# Patient Record
Sex: MALE | Race: WHITE | Employment: FULL TIME | URBAN - METROPOLITAN AREA
[De-identification: names, ages, dates, MRNs, and addresses within clinical notes are randomized per-mention and may not be internally consistent; named-entity substitution may affect disease eponyms.]

---

## 2017-02-13 ENCOUNTER — TELEPHONE (OUTPATIENT)
Dept: BARIATRICS/WEIGHT MGMT | Age: 52
End: 2017-02-13

## 2018-02-04 ENCOUNTER — TELEPHONE (OUTPATIENT)
Dept: BARIATRICS/WEIGHT MGMT | Age: 53
End: 2018-02-04

## 2020-01-02 ENCOUNTER — OFFICE VISIT (OUTPATIENT)
Dept: BARIATRICS/WEIGHT MGMT | Age: 55
End: 2020-01-02
Payer: COMMERCIAL

## 2020-01-02 ENCOUNTER — HOSPITAL ENCOUNTER (OUTPATIENT)
Age: 55
Discharge: HOME | End: 2020-01-02
Payer: COMMERCIAL

## 2020-01-02 VITALS
BODY MASS INDEX: 44.1 KG/M2 | SYSTOLIC BLOOD PRESSURE: 134 MMHG | WEIGHT: 315 LBS | HEART RATE: 58 BPM | HEIGHT: 71 IN | RESPIRATION RATE: 15 BRPM | DIASTOLIC BLOOD PRESSURE: 88 MMHG

## 2020-01-02 LAB
A/G RATIO: 1.2 (ref 1.1–2.2)
ALBUMIN SERPL-MCNC: 3.9 G/DL (ref 3.4–5)
ALP BLD-CCNC: 70 U/L (ref 40–129)
ALT SERPL-CCNC: 15 U/L (ref 10–40)
ANION GAP SERPL CALCULATED.3IONS-SCNC: 12 MMOL/L (ref 3–16)
AST SERPL-CCNC: 19 U/L (ref 15–37)
BASOPHILS ABSOLUTE: 0 K/UL (ref 0–0.2)
BASOPHILS RELATIVE PERCENT: 0.4 %
BILIRUB SERPL-MCNC: 0.5 MG/DL (ref 0–1)
BUN BLDV-MCNC: 16 MG/DL (ref 7–20)
CALCIUM SERPL-MCNC: 9.1 MG/DL (ref 8.3–10.6)
CHLORIDE BLD-SCNC: 103 MMOL/L (ref 99–110)
CHOLESTEROL, TOTAL: 185 MG/DL (ref 0–199)
CO2: 26 MMOL/L (ref 21–32)
CREAT SERPL-MCNC: 0.7 MG/DL (ref 0.9–1.3)
EOSINOPHILS ABSOLUTE: 0.1 K/UL (ref 0–0.6)
EOSINOPHILS RELATIVE PERCENT: 2.1 %
FOLATE: 5.78 NG/ML (ref 4.78–24.2)
GFR AFRICAN AMERICAN: >60
GFR NON-AFRICAN AMERICAN: >60
GLOBULIN: 3.2 G/DL
GLUCOSE BLD-MCNC: 99 MG/DL (ref 70–99)
HCT VFR BLD CALC: 40.3 % (ref 40.5–52.5)
HDLC SERPL-MCNC: 44 MG/DL (ref 40–60)
HEMOGLOBIN: 13.4 G/DL (ref 13.5–17.5)
IRON SATURATION: 32 % (ref 20–50)
IRON: 88 UG/DL (ref 59–158)
LDL CHOLESTEROL CALCULATED: 116 MG/DL
LYMPHOCYTES ABSOLUTE: 1.7 K/UL (ref 1–5.1)
LYMPHOCYTES RELATIVE PERCENT: 24.6 %
MCH RBC QN AUTO: 30.8 PG (ref 26–34)
MCHC RBC AUTO-ENTMCNC: 33.3 G/DL (ref 31–36)
MCV RBC AUTO: 92.3 FL (ref 80–100)
MONOCYTES ABSOLUTE: 0.6 K/UL (ref 0–1.3)
MONOCYTES RELATIVE PERCENT: 8.9 %
NEUTROPHILS ABSOLUTE: 4.3 K/UL (ref 1.7–7.7)
NEUTROPHILS RELATIVE PERCENT: 64 %
PDW BLD-RTO: 13.4 % (ref 12.4–15.4)
PLATELET # BLD: 231 K/UL (ref 135–450)
PMV BLD AUTO: 8.6 FL (ref 5–10.5)
POTASSIUM SERPL-SCNC: 4.8 MMOL/L (ref 3.5–5.1)
RBC # BLD: 4.37 M/UL (ref 4.2–5.9)
SODIUM BLD-SCNC: 141 MMOL/L (ref 136–145)
T4 FREE: 1.1 NG/DL (ref 0.9–1.8)
TOTAL IRON BINDING CAPACITY: 279 UG/DL (ref 260–445)
TOTAL PROTEIN: 7.1 G/DL (ref 6.4–8.2)
TRIGL SERPL-MCNC: 123 MG/DL (ref 0–150)
TSH REFLEX: 4.79 UIU/ML (ref 0.27–4.2)
VITAMIN B-12: 162 PG/ML (ref 211–911)
VITAMIN D 25-HYDROXY: 15.7 NG/ML
VLDLC SERPL CALC-MCNC: 25 MG/DL
WBC # BLD: 6.8 K/UL (ref 4–11)

## 2020-01-02 PROCEDURE — 84597 ASSAY OF VITAMIN K: CPT

## 2020-01-02 PROCEDURE — 84443 ASSAY THYROID STIM HORMONE: CPT

## 2020-01-02 PROCEDURE — 84439 ASSAY OF FREE THYROXINE: CPT

## 2020-01-02 PROCEDURE — 84446 ASSAY OF VITAMIN E: CPT

## 2020-01-02 PROCEDURE — 84590 ASSAY OF VITAMIN A: CPT

## 2020-01-02 PROCEDURE — 80061 LIPID PANEL: CPT

## 2020-01-02 PROCEDURE — 36415 COLL VENOUS BLD VENIPUNCTURE: CPT

## 2020-01-02 PROCEDURE — 82306 VITAMIN D 25 HYDROXY: CPT

## 2020-01-02 PROCEDURE — 80053 COMPREHEN METABOLIC PANEL: CPT

## 2020-01-02 PROCEDURE — 82746 ASSAY OF FOLIC ACID SERUM: CPT

## 2020-01-02 PROCEDURE — 99214 OFFICE O/P EST MOD 30 MIN: CPT | Performed by: NURSE PRACTITIONER

## 2020-01-02 PROCEDURE — 82607 VITAMIN B-12: CPT

## 2020-01-02 PROCEDURE — 83550 IRON BINDING TEST: CPT

## 2020-01-02 PROCEDURE — 84425 ASSAY OF VITAMIN B-1: CPT

## 2020-01-02 PROCEDURE — 83036 HEMOGLOBIN GLYCOSYLATED A1C: CPT

## 2020-01-02 PROCEDURE — 83540 ASSAY OF IRON: CPT

## 2020-01-02 PROCEDURE — 85025 COMPLETE CBC W/AUTO DIFF WBC: CPT

## 2020-01-02 ASSESSMENT — ENCOUNTER SYMPTOMS
GASTROINTESTINAL NEGATIVE: 1
RESPIRATORY NEGATIVE: 1

## 2020-01-02 NOTE — PROGRESS NOTES
Dietary Assessment Note    Vitals:   Vitals:    01/02/20 0824   BP: (!) 146/88   Pulse: 58   Resp: 15   Weight: (!) 355 lb 8 oz (161.3 kg)   Height: 5' 11\" (1.803 m)    Patient gained 15.5 lbs over 4 years 9 months. Total Weight Loss: 104.5 lbs    Labs reviewed: no lab studies available for review at time of visit    Protein intake: Patient not tracking     Fluid intake: 2-3 bottles water/day + Coffee w/ cream/sugar, 1 pop/day    Multivitamin/mineral intake: Not taking any    Calcium intake: none    Other: none    Exercise: Nothing structured- Likes to hunt + has a gym available at work     Nutrition Assessment: 7.5 years post-op visit. Pt reports he has tried the [employer] and [employer]ipe[employer] in the year. Pt works 3rd shift wakes up around 5-6 PM, eating about 4 times/day. Breakfast: 4:38 AM: 10 slices hammond + 3 eggs + 2 biscuits     Lunch: 2:30 PM: Small bowl sauerkraut + 1 spare rib     Snack: 4 PM: 12 crackers + 4 tablespoons cheese ball    Dinner: 6:74 PM: 3 slices pizza    Amount able to eat per sitting: No restriction    Following 30/30/30 rule: Eats in 20-30 minutes. Will drink w/ meals. Food Intolerances/issues: Chicken     Assessment  Nutritional Needs: RMR=(9.99 x 161.3) + (6.25 x 180.3) - (4.92 x 54 y.o.) +5  = 2482 kcal x 1.4 (sedentary activity factor)= 3475 kcal - 1000 (for 2 lb weight loss/week)= 2475 kcal. Estimated calorie needs are not appropriate at this stage.     Client Concerns: Getting back on track    Goals:   - Start 1800 calorie, low carb meal plan  - Take 3 MVI and 1 Citracal Petite per day  - Take 30 minutes to eat your meals and wait 30 minutes between eating and drinking  - Aim for 2-3 days of exercise/week    Plan: Follow up per provider reccomendation    [facility]

## 2020-01-03 LAB
ESTIMATED AVERAGE GLUCOSE: 116.9 MG/DL
HBA1C MFR BLD: 5.7 %

## 2020-01-04 LAB
ALPHA-TOCOPHEROL: 9.1 MG/L (ref 5.5–18)
GAMMA-TOCOPHEROL: 1.2 MG/L (ref 0–6)
RETINYL PALMITATE: 0.03 MG/L (ref 0–0.1)
VITAMIN A LEVEL: 0.46 MG/L (ref 0.3–1.2)
VITAMIN A, INTERP: NORMAL
VITAMIN B1 WHOLE BLOOD: 137 NMOL/L (ref 70–180)

## 2020-01-05 LAB — VITAMIN K1: 0.27 NMOL/L (ref 0.22–4.88)

## 2020-01-09 ENCOUNTER — TELEPHONE (OUTPATIENT)
Dept: BARIATRICS/WEIGHT MGMT | Age: 55
End: 2020-01-09

## 2020-01-09 RX ORDER — ERGOCALCIFEROL 1.25 MG/1
50000 CAPSULE ORAL WEEKLY
Qty: 12 CAPSULE | Refills: 0 | Status: SHIPPED | OUTPATIENT
Start: 2020-01-09 | End: 2020-04-30

## 2020-03-05 ENCOUNTER — OFFICE VISIT (OUTPATIENT)
Dept: BARIATRICS/WEIGHT MGMT | Age: 55
End: 2020-03-05
Payer: COMMERCIAL

## 2020-03-05 VITALS
SYSTOLIC BLOOD PRESSURE: 132 MMHG | HEART RATE: 70 BPM | WEIGHT: 315 LBS | OXYGEN SATURATION: 98 % | DIASTOLIC BLOOD PRESSURE: 80 MMHG | HEIGHT: 71 IN | BODY MASS INDEX: 44.1 KG/M2

## 2020-03-05 PROCEDURE — 99213 OFFICE O/P EST LOW 20 MIN: CPT | Performed by: NURSE PRACTITIONER

## 2020-03-05 RX ORDER — POTASSIUM CHLORIDE 750 MG/1
10 CAPSULE, EXTENDED RELEASE ORAL 2 TIMES DAILY
COMMUNITY
End: 2020-03-05 | Stop reason: SDUPTHER

## 2020-03-05 RX ORDER — POTASSIUM CHLORIDE 20 MEQ/1
TABLET, EXTENDED RELEASE ORAL
COMMUNITY
Start: 2020-02-21

## 2020-03-05 RX ORDER — FUROSEMIDE 40 MG/1
TABLET ORAL
COMMUNITY
Start: 2020-02-21

## 2020-03-05 ASSESSMENT — ENCOUNTER SYMPTOMS
RESPIRATORY NEGATIVE: 1
GASTROINTESTINAL NEGATIVE: 1

## 2020-03-05 NOTE — PATIENT INSTRUCTIONS
Diet tips to help make you successful postoperatively  Eating habits after surgery will have to be a permanent and long-term change. Eating habits are so ingrained that it can be difficult to change. It is important to maintain these new eating habits after surgery. Also remember that overall health, age, and genetics make each persons weight loss progress different. Do not compare your progress, the amount you eat, or exercise to other patients.  Protein first at every meal- Eat the protein portion of your meal first. Eating protein helps the body feel full and sends a signal to stop eating. Protein is very important in building tissue in the body.  Eat at least 4 times per day- This includes protein supplements and small meals with a high amount of protein   Chewing your food thoroughly- Eating too quickly and improper chewing can cause pain and vomiting after surgery.  Slowing down the speed at which you eat- Refill your fork only after you swallow. Adopt a new pattern of eating by taking a bite of food and putting your utensil down between bites. This will help to reduce the feeling of food being stuck.    Drink water and start drinking fluids slowly- Drink at least 48 ounces per day minimum. Sip fluids as if they were hot beverages. If you find it difficult to stop gulping liquids, try using a sippy cup or a sport top water bottle.  Make sure you are eating meals without drinking fluids- After surgery you will not be allowed to drink fluids 30 minutes before, during, or 30 minutes after your meal (30/30/30 rule). This will be a life-long behavior change. The reason for the rule is to keep food from passing through your smaller stomach more rapidly. This will cause you to feel hungry shortly after your meal.   Continue to avoid caffeine and carbonated beverages- Caffeine acts as a diuretic and can be dehydrating as well as irritating to the lining of the stomach.  Carbonated beverages release gas and can expand the stomach.  Continue to keep temptation from your kitchen- Keep your pantry and kitchen cabinets cleaned out of those dangerous foods that might tempt you after surgery (chips, cookies, candy, etc.).  Continue to increase your exercise program- Increase your daily physical activity. Aim for 5-6 days per week for 30 minutes. Walking is an easy way to get started with exercising. Exercise is going to be a regular part of your life after surgery.  Make sure you have a good support system- There will be many changes and adjustments to make after surgery. It is important to have a supportive friend, family member or co-worker, etc. with whom you can talk. Continue to attend [facility]) Weight Management support groups as they can be helpful in maintaining behaviors. In addition, it is the responsibility of the patient to schedule and follow up on labs and tests completed after surgery. Results will be reviewed at each visit.

## 2020-03-05 NOTE — PROGRESS NOTES
[facility]) Physicians   Weight Management Solutions    Subjective:      Patient ID: Chad Orozco is a 48 y.o. male    HPI    7.5 years s/p bypass (sx 7/2012) . He is here for a 1 month follow up, came back last month to get back on track. He was given the 1800 calorie low carb meal plan to follow at last visit. Labs completed and reviewed over the phone. He is now back on track with following post op guidelines. Chad Orozco is a very pleasant 48 y.o. male , Body mass index is 48.54 kg/m². Jef Car Patient denies any nausea, vomiting, fevers, chills, shortness of breath, chest pain,constipation or urinary symptoms. Denies any heartburn nor dysphagia. Past Medical History:   Diagnosis Date    Back pain     intermittent    Hypertension     Sleep apnea     uses cpap occassionally     Past Surgical History:   Procedure Laterality Date    GASTRIC BYPASS SURGERY  7/23/2012    LAPAROSCOPIC GASTRIC BYPASS ROBOTIC ASSISTED    HERNIA REPAIR      age     Family History   Problem Relation Age of Onset    Coronary Art Dis Unknown     Diabetes Unknown      Social History     Tobacco Use    Smoking status: Never Smoker    Smokeless tobacco: Never Used   Substance Use Topics    Alcohol use: Not Currently     Comment: rare     I counseled the patient on the importance of not smoking and risks of ETOH. No Known Allergies  Vitals:    03/05/20 0936   BP: 132/80   Pulse: 70   SpO2: 98%   Weight: (!) 348 lb (157.9 kg)   Height: 5' 11\" (1.803 m)       Body mass index is 48.54 kg/m². Current Outpatient Medications:     furosemide (LASIX) 40 MG tablet, , Disp: , Rfl:     potassium chloride (KLOR-CON M) 20 MEQ extended release tablet, , Disp: , Rfl:     vitamin D (ERGOCALCIFEROL) 1.25 MG (17266 UT) CAPS capsule, Take 1 capsule by mouth once a week for 12 doses, Disp: 12 capsule, Rfl: 0    Multiple Vitamins-Minerals (THERAPEUTIC MULTIVITAMIN-MINERALS) tablet, Take 1 tablet by mouth daily. , Disp: , Rfl:     aspirin 81 MG tablet, Take 81 mg by mouth daily. , Disp: , Rfl:     Calcium Carbonate-Vitamin D (CALCIUM + D PO), Take  by mouth. Indications: pt unsure of dose, Disp: , Rfl:     Cyanocobalamin (B-12 PO), Take  by mouth. Indications: pt unsure of dose, Disp: , Rfl:         Review of Systems   Constitutional: Negative. HENT: Negative. Respiratory: Negative. Cardiovascular: Negative. Gastrointestinal: Negative. Endocrine: Negative. Genitourinary: Negative. Skin: Negative. Neurological: Negative. Psychiatric/Behavioral: Negative. Objective:    Physical Exam  Constitutional:       Appearance: He is well-developed. HENT:      Head: Normocephalic and atraumatic. Cardiovascular:      Rate and Rhythm: Normal rate. Pulmonary:      Effort: Pulmonary effort is normal.   Abdominal:      Palpations: Abdomen is soft. Tenderness: There is no abdominal tenderness. Skin:     General: Skin is warm and dry. Neurological:      Mental Status: He is alert and oriented to person, place, and time. Psychiatric:         Behavior: Behavior normal.         Thought Content: Thought content normal.         Judgment: Judgment normal.           Assessment and Plan:   Patient is 7.5 years s/p gastric bypass, down 7.5lbs. . The patient's current Body mass index is 48.54 kg/m². (3/5/20). He is doingwell, denies n/v/dysphagia or reflux. He  is tolerating diet, getting adequate fluids and protein. He has the 1800 calorie low carb meal plan that he has been using as a food guide. Goal to reduce fast food intake. He  is exercising with walking, but not doing intentional exercise. He plans to start doing his home stationary bike. Encouraged continued physical activity. Heis taking vitamins as instructed. He  did meet with the registered dietitian for continued follow up. I agree with recommendations and plan.  We discussed his option for continued weight loss, he would like to begin our MWM program. He will decide whether he wants to utilize the [facility] [facility] wellness program. We will see him back in 1 months for MWM. I have spent 15 min counseling patient.

## 2020-04-30 ENCOUNTER — TELEMEDICINE (OUTPATIENT)
Dept: BARIATRICS/WEIGHT MGMT | Age: 55
End: 2020-04-30
Payer: COMMERCIAL

## 2020-04-30 VITALS — WEIGHT: 315 LBS | BODY MASS INDEX: 48.59 KG/M2

## 2020-04-30 PROCEDURE — 99213 OFFICE O/P EST LOW 20 MIN: CPT | Performed by: NURSE PRACTITIONER

## 2020-04-30 ASSESSMENT — ENCOUNTER SYMPTOMS
RESPIRATORY NEGATIVE: 1
GASTROINTESTINAL NEGATIVE: 1

## 2020-04-30 NOTE — PROGRESS NOTES
Psychiatric/Behavioral: Negative. PHYSICAL EXAMINATION:    Constitutional: [x] Appears well-developed and well-nourished [x] No apparent distress      [] Abnormal-   Mental status  [x] Alert and awake  [x] Oriented to person/place/time [x]Able to follow commands      Eyes:  EOM    [x]  Normal  [] Abnormal-  Sclera  [x]  Normal  [] Abnormal -         Discharge [x]  None visible  [] Abnormal -    HENT:   [x] Normocephalic, atraumatic. [] Abnormal   [x] Mouth/Throat: Mucous membranes are moist.     External Ears [] Normal  [] Abnormal-     Neck: [x] No visualized mass     Pulmonary/Chest: [x] Respiratory effort normal.  [x] No visualized signs of difficulty breathing or respiratory distress        [] Abnormal-      Musculoskeletal:   [] Normal gait with no signs of ataxia         [x] Normal range of motion of neck        [] Abnormal-     Neurological:        [x] No Facial Asymmetry (Cranial nerve 7 motor function) (limited exam to video visit)          [x] No gaze palsy        [] Abnormal-         Skin:        [x] No significant exanthematous lesions or discoloration noted on facial skin         [] Abnormal-            Psychiatric:       [x] Normal Affect [x] No Hallucinations        [] Abnormal-     Other pertinent observable physical exam findings-     Due to this being a TeleHealth encounter, evaluation of the following organ systems is limited: Vitals/Constitutional/EENT/Resp/CV/GI//MS/Neuro/Skin/Heme-Lymph-Imm. Assessment and Plan:    Patient is here via telemedicine for their 3rd medical weight loss visit, up 0.4 lbs and a total weight loss of 0.1 lbs. The patient's current Body mass index is 48.59 kg/m². (4/30/20). He is doing fairly well, making dietary and behavior modifications. He has struggled with being off of work and being out of routine. He reports larger portions, night time snacking and increase in soda intake. He will also work to increase his fluids, as he is low right now.  He is

## 2020-12-02 ENCOUNTER — OFFICE VISIT (OUTPATIENT)
Dept: BARIATRICS/WEIGHT MGMT | Age: 55
End: 2020-12-02
Payer: COMMERCIAL

## 2020-12-02 VITALS
RESPIRATION RATE: 16 BRPM | WEIGHT: 315 LBS | OXYGEN SATURATION: 98 % | HEART RATE: 67 BPM | BODY MASS INDEX: 44.1 KG/M2 | HEIGHT: 71 IN | TEMPERATURE: 97.5 F | SYSTOLIC BLOOD PRESSURE: 138 MMHG | DIASTOLIC BLOOD PRESSURE: 92 MMHG

## 2020-12-02 PROCEDURE — 99213 OFFICE O/P EST LOW 20 MIN: CPT | Performed by: NURSE PRACTITIONER

## 2020-12-02 RX ORDER — LEVOTHYROXINE SODIUM 0.03 MG/1
TABLET ORAL
COMMUNITY
Start: 2020-09-22

## 2020-12-02 RX ORDER — LISINOPRIL 5 MG/1
5 TABLET ORAL DAILY
COMMUNITY
Start: 2020-11-04 | End: 2021-02-02

## 2020-12-02 ASSESSMENT — ENCOUNTER SYMPTOMS
RESPIRATORY NEGATIVE: 1
GASTROINTESTINAL NEGATIVE: 1

## 2020-12-02 NOTE — PATIENT INSTRUCTIONS
Patient received dietary handouts and education. Diet tips to help make you successful postoperatively  Eating habits after surgery will have to be a permanent and long-term change. Eating habits are so ingrained that it can be difficult to change. It is important to maintain these new eating habits after surgery. Also remember that overall health, age, and genetics make each persons weight loss progress different. Do not compare your progress, the amount you eat, or exercise to other patients.  Protein first at every meal- Eat the protein portion of your meal first. Eating protein helps the body feel full and sends a signal to stop eating. Protein is very important in building tissue in the body.  Eat at least 4 times per day- This includes protein supplements and small meals with a high amount of protein   Chewing your food thoroughly- Eating too quickly and improper chewing can cause pain and vomiting after surgery.  Slowing down the speed at which you eat- Refill your fork only after you swallow. Adopt a new pattern of eating by taking a bite of food and putting your utensil down between bites. This will help to reduce the feeling of food being stuck.    Drink water and start drinking fluids slowly- Drink at least 48 ounces per day minimum. Sip fluids as if they were hot beverages. If you find it difficult to stop gulping liquids, try using a sippy cup or a sport top water bottle.  Make sure you are eating meals without drinking fluids- After surgery you will not be allowed to drink fluids 30 minutes before, during, or 30 minutes after your meal (30/30/30 rule). This will be a life-long behavior change. The reason for the rule is to keep food from passing through your smaller stomach more rapidly.  This will cause you to feel hungry shortly after your meal.   Continue to avoid caffeine and carbonated beverages- Caffeine acts as a diuretic and can be dehydrating as well as irritating to the lining of the stomach. Carbonated beverages release gas and can expand the stomach.  Continue to keep temptation from your kitchen- Keep your pantry and kitchen cabinets cleaned out of those dangerous foods that might tempt you after surgery (chips, cookies, candy, etc.).  Continue to increase your exercise program- Increase your daily physical activity. Aim for 5-6 days per week for 30 minutes. Walking is an easy way to get started with exercising. Exercise is going to be a regular part of your life after surgery.  Make sure you have a good support system- There will be many changes and adjustments to make after surgery. It is important to have a supportive friend, family member or co-worker, etc. with whom you can talk. Continue to attend [facility]) Weight Management support groups as they can be helpful in maintaining behaviors. In addition, it is the responsibility of the patient to schedule and follow up on labs and tests completed after surgery. Results will be reviewed at each visit.

## 2020-12-02 NOTE — PROGRESS NOTES
[facility]) Physicians   Weight Management Solutions    Subjective:      Patient ID: Lalo Corea is a 48 y.o. male    HPI    8 years 5 months s/p gastric bypass. Lalo Corea is a very pleasant 48 y.o. male , Body mass index is 51.05 kg/m². Elkin Falk Patient denies any nausea, vomiting, fevers, chills, shortness of breath, chest pain,constipation or urinary symptoms. Denies any heartburn nor dysphagia. He had transitioned to our MWM program in the Spring and was started on 1800 calorie low carb meal plan, but has not followed up since April. He is here today for post-op follow up. His BP is high today, did come down a bit after sitting. Past Medical History:   Diagnosis Date    Back pain     intermittent    Hypertension     Sleep apnea     uses cpap occassionally     Past Surgical History:   Procedure Laterality Date    GASTRIC BYPASS SURGERY  7/23/2012    LAPAROSCOPIC GASTRIC BYPASS ROBOTIC ASSISTED    HERNIA REPAIR      age     Family History   Problem Relation Age of Onset    Coronary Art Dis Other     Diabetes Other      Social History     Tobacco Use    Smoking status: Never Smoker    Smokeless tobacco: Never Used   Substance Use Topics    Alcohol use: Not Currently     Comment: rare     I counseled the patient on the importance of not smoking and risks of ETOH. No Known Allergies  Vitals:    12/02/20 1455 12/02/20 1538   BP: (!) 152/114 (!) 138/92   Site:  Left Upper Arm   Position:  Sitting   Cuff Size:  Large Adult   Pulse: 67    Resp: 16    Temp: 97.5 °F (36.4 °C)    SpO2: 98%    Weight: (!) 366 lb (166 kg)    Height: 5' 11\" (1.803 m)        Body mass index is 51.05 kg/m².       Current Outpatient Medications:     lisinopril (PRINIVIL;ZESTRIL) 5 MG tablet, Take 5 mg by mouth daily, Disp: , Rfl:     levothyroxine (SYNTHROID) 25 MCG tablet, , Disp: , Rfl:     furosemide (LASIX) 40 MG tablet, , Disp: , Rfl:     potassium chloride (KLOR-CON M) 20 MEQ extended release tablet, , Disp: , Rfl:   Multiple Vitamins-Minerals (THERAPEUTIC MULTIVITAMIN-MINERALS) tablet, Take 1 tablet by mouth daily. , Disp: , Rfl:     aspirin 81 MG tablet, Take 81 mg by mouth daily. , Disp: , Rfl:     Calcium Carbonate-Vitamin D (CALCIUM + D PO), Take  by mouth. Indications: pt unsure of dose, Disp: , Rfl:     Cyanocobalamin (B-12 PO), Take  by mouth. Indications: pt unsure of dose, Disp: , Rfl:         Review of Systems   Constitutional: Negative. HENT: Negative. Respiratory: Negative. Cardiovascular: Negative. Gastrointestinal: Negative. Endocrine: Negative. Genitourinary: Negative. Skin: Negative. Neurological: Negative. Psychiatric/Behavioral: Negative. Objective:    Physical Exam  Constitutional:       Appearance: He is well-developed. HENT:      Head: Normocephalic and atraumatic. Cardiovascular:      Rate and Rhythm: Normal rate. Pulmonary:      Effort: Pulmonary effort is normal.   Skin:     General: Skin is warm and dry. Neurological:      Mental Status: He is alert and oriented to person, place, and time. Psychiatric:         Behavior: Behavior normal.         Thought Content: Thought content normal.         Judgment: Judgment normal.           Assessment and Plan:   Patient is 8 years 5 months s/p gastric bypass, gained 17.6 lbs. The patient's current Body mass index is 51.05 kg/m². (12/2/20). He is doingwell, denies n/v/dysphagia or reflux. He  is tolerating diet, getting adequate protein. He is low on fluids. He will work to eliminate the soda and juice and increase his total fluids to 48 ounces a day. We enoch start him on the 1200 calorie post op meal plan. He  is not currently exercising, plans to start working out at his employers gym next week after he gets off work. Encouraged continued physical activity. He is not taking vitamins as instructed, but plans to restart. He is buying FUSION today. He  did meet with the registered dietitian for continued follow up.

## 2020-12-02 NOTE — PROGRESS NOTES
[facility] gained 17.6 lbs over past 3 months. Pt is s/p bypass in . Reports since his last visit his father , and he was not working for a period of time. Pt is now back to work and is working third shift from 10p-6a. Feels he is off track and wants help getting back on track. Treatment plan details: 1800 kcal LC   Is patient adhering to treatment plan: Not following     Breakfast: 8-8:30- leftovers OR eggs     Snack: sleeping     Lunch: 5-6 pm - pork chops/chicken/fish with veggies with rice/pasta    Snack: nothing     Dinner: 12am -  pork chops/chicken/fish with veggies with rice/pasta OR frozen meal     Snack: 2 am - cheese stick OR piece of fruit OR protein bar OR CC     Snack: 4 am - skips and then is really hungry later    Is pt including lean protein with all meals and snacks? Mostly     Is pt avoiding added sugars and starchy foods? Rice/pasta but limiting options like mac and cheese    Consuming at least 64oz of calorie free fluids? No - drinking 24-40 oz/day of total fluids - drinks water, soda, coffee with cream and regular sugar, OJ, 2% milk    Participating in intentional exercise?  Not currently, gym at work closed, but reports it plans to reopen next week    Supplementation: 2 MVI, 600 mg Calcium carbonate, B12    Amount able to eat per sittin.5 cups     Following 30-30-30 Rule: Eats in 20-25 minutes, does eat and drink at the same time     Plan/Goals:   Start 1200 kcal post op plan - provided and reviewed with pt   Add in snack at 4 am  Follow 30-30-30 rule   Avoid soda/juice/switch to 1% milk   Increase fluid intake to 48 oz/day  Discussed MVI regimen - take 4 fusion and eliminate MVI/Calcium   Plans to exercise at gym when getting off work     Handouts: 1200 kcal post op meal plan, LC frozen meals, MVI alternative     Jhonny Stade

## 2021-01-08 ENCOUNTER — OFFICE VISIT (OUTPATIENT)
Dept: BARIATRICS/WEIGHT MGMT | Age: 56
End: 2021-01-08
Payer: COMMERCIAL

## 2021-01-08 VITALS — TEMPERATURE: 98.2 F

## 2021-01-08 DIAGNOSIS — Z71.89 INDIVIDUAL COUNSELING ENCOUNTER: Primary | ICD-10-CM

## 2021-01-08 PROCEDURE — 96156 HLTH BHV ASSMT/REASSESSMENT: CPT | Performed by: SOCIAL WORKER

## 2021-01-08 NOTE — PROGRESS NOTES
Shaheen Vega is a 48 y.o. male who presents today for individual counseling encounter / psychosocial assessment related to behavioral health. Shaheen Vega is presented open throughout assessment. Patient appeared with appropriate insight and cognition. This note will not be viewable in MyChart for the following reason(s). This is a Psychotherapy Note. Presenting Problem:   Patient was referred by NP, states he is trying to get back into a good routine. Home life / Family relationships / Supports:   Patient lives with his spouse. Patient reports he has 5 grown children and 7 grandchildren. States his family is close and his wife is supportive of trying be healthier along with him. Hobbies/Positives:   Patient reports he enjoys hunting and fishing. 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 Psychiatric hx  n/a    Trauma Hx  Childhood trauma, lost 2 parents within the past 1.5 years     Daily Health Concerns/Limitations  High BP    Substance Use:  \"just wine on Friday or Saturday like 1 or 2 glasses\"    Current needs / Limitations   Trying to break \"bad habits\" he has formed like stopping at gas station for chips and pop. Was planning to get back to the gym and then was closed again due to covid  Getting past the \"holiday eating\"   \"i'm doing terrible with fluids, I need to drink more I know\"     Additional Questions/Concerns per patient  n/a    Behaviorist overview:   Patient appears motivated at this time. Patient states that speaking will help him with accountability. Encouraged patient to follow up as needed. Goals    None            Patient and therapist spent majority of session discussing above goals and ways to achieve success with each goal. Follow up care has been discussed with patient in relation to goals and concerns addressed today. Same day cancellation/no show policy was discussed with patient per behaviorist guidelines. 30 minutes was spent in assessment and direct counseling with patient.      [address] [place] [address]